# Patient Record
Sex: MALE | Race: WHITE | URBAN - METROPOLITAN AREA
[De-identification: names, ages, dates, MRNs, and addresses within clinical notes are randomized per-mention and may not be internally consistent; named-entity substitution may affect disease eponyms.]

---
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
## 2018-01-10 ENCOUNTER — TELEPHONE (OUTPATIENT)
Dept: FAMILY MEDICINE | Facility: OTHER | Age: 18
End: 2018-01-10

## 2018-01-10 NOTE — TELEPHONE ENCOUNTER
LM for patient to return call.   Need verbal permission to speak with his Mom regarding his health.   Can then call Mom back.   If I do not hear back from patient shortly, I will call Mom to let her know.     Janiya Nino RN, BSN

## 2018-01-10 NOTE — TELEPHONE ENCOUNTER
"Reason for call:  Patient reporting a symptom    Symptom or request:  Ongoing dizziness and trouble concentrating.  At times feels like he \"lost a few minutes\"?  He is in college so mom is calling , patient not present at time of phone call.  Mom is just looking to speak with a nurse regarding low iron and if the symptoms could be related?  Informed her we do not have c2c on file.  She said she should be on his file?  She still would like to just speak with a nurse line then?    Duration (how long have symptoms been present): ongoing    Have you been treated for this before? No    Additional comments: n/a    Phone Number patient can be reached at:  Home number on file [phone] (home)    Best Time:  any    Can we leave a detailed message on this number:  YES    Call taken on 1/10/2018 at 2:32 PM by Caitlyn Montes    "

## 2018-01-10 NOTE — TELEPHONE ENCOUNTER
Called Mom.   Advised that I do not have CTC on file with her.   Advised that I could answer general questions, which I did regarding low iron and ferritin.   Mom gave me a lot of information regarding him and tried to get specific questions answered, but I only gave general information.   She did mention that patient started iron pills.   She asks that I try him again tomorrow to follow up with questions and how he is feeling.     Janiya Nino, RN, BSN

## 2018-01-10 NOTE — TELEPHONE ENCOUNTER
I spoke with patient and received verbal permission to speak with Mom.   His questions were answered and he states that he will get back in touch with his mom.   No need for further follow up.     Janiya Nino, RN, BSN

## 2018-01-22 ENCOUNTER — TELEPHONE (OUTPATIENT)
Dept: FAMILY MEDICINE | Facility: OTHER | Age: 18
End: 2018-01-22

## 2018-01-22 DIAGNOSIS — R79.0 LOW FERRITIN: Primary | ICD-10-CM

## 2018-01-22 NOTE — TELEPHONE ENCOUNTER
Reason for Call: Request for an order or referral:    Order or referral being requested: Iron & Ferratin     Date needed: as soon as possible    Has the patient been seen by the PCP for this problem? YES    Additional comments: Pt's coaches at [employer] are concerned about the low levels at his last OV with how active he is. They suggested he have this done every 2-4 weeks until April. They told him to start iron supplements.     Phone number Patient can be reached at:  [phone]    Best Time:  Any     Can we leave a detailed message on this number?  YES    Call taken on 1/22/2018 at 3:14 PM by Beatrice Li

## 2018-01-22 NOTE — LETTER
[address]  [facility] [address]  [phone]        January 24, 2018    Tony Bergeron  [address] [facility] [address]          Dear Tony,    Orders have been placed for routine labs for your iron. Diet is still best way to get iron but iron supplements are available over the counter. If you take these please do so with vitamin C rich foods or supplement to increase absorption and reduce constipation. Please contact the clinic with any questions or concerns.     Sincerely,        Asia Gambino MD

## 2018-01-22 NOTE — TELEPHONE ENCOUNTER
Diet is still best way to get iron. We can monitor closely for improvement. Orders in for labs.  Iron supplements are available over the counter, and if you take these, please do so with vit C foods or supplement to increase absorption and reduce constipation.  Asia Gambino MD

## 2018-01-24 ENCOUNTER — RESULT FOLLOW UP (OUTPATIENT)
Dept: FAMILY MEDICINE | Facility: OTHER | Age: 18
End: 2018-01-24

## 2018-01-24 ENCOUNTER — TELEPHONE (OUTPATIENT)
Dept: FAMILY MEDICINE | Facility: OTHER | Age: 18
End: 2018-01-24

## 2018-01-24 NOTE — TELEPHONE ENCOUNTER
Pt returned phone call from today - see other encounter. (I am unable to access it to addend it, that is why a new encounter is created here.)  He understands and stated he is unable to return to ER clinic. I let him know order is in for any [place] clinic. No further questions or concerns at this time.    Matilda Serrano, CMA

## 2018-01-29 ENCOUNTER — HEALTH MAINTENANCE LETTER (OUTPATIENT)
Age: 18
End: 2018-01-29

## 2018-02-16 DIAGNOSIS — R79.0 LOW FERRITIN: ICD-10-CM

## 2018-02-16 LAB — FERRITIN SERPL-MCNC: 28 NG/ML (ref 26–388)

## 2018-02-16 PROCEDURE — 82728 ASSAY OF FERRITIN: CPT | Performed by: FAMILY MEDICINE

## 2018-02-16 PROCEDURE — 36415 COLL VENOUS BLD VENIPUNCTURE: CPT | Performed by: FAMILY MEDICINE

## 2018-02-18 NOTE — PROGRESS NOTES
Tony, your results show much improvement. Your coaches wanted to watch this until April, so another order is in. Continue to take in high iron foods.  Please let me know if you have any questions.    Asia Gambino MD

## 2018-03-29 ENCOUNTER — TELEPHONE (OUTPATIENT)
Dept: FAMILY MEDICINE | Facility: OTHER | Age: 18
End: 2018-03-29

## 2018-03-29 NOTE — TELEPHONE ENCOUNTER
Reason for call:  Form  Reason for Call:  Form, our goal is to have forms completed with 72 hours, however, some forms may require a visit or additional information.    Type of letter, form or note:  medical    Who is the form from?: Home care    Where did the form come from: form was faxed in    What clinic location was the form placed at?: [facility] - [phone]    Where the form was placed: [facility]    What number is listed as a contact on the form?: [phone]       Additional comments: please sign,date and return    Call taken on 3/29/2018 at 8:41 AM by Donna Dobson

## 2018-03-30 NOTE — TELEPHONE ENCOUNTER
Unable to locate this form. Encounter creator not in clinic today, so unable to ask her.    Spoke with two people at [facility] - they have no idea who faxed a form or what the form was. She states she will send an email out and if they can figure out what the form was, they will refax it. I will close this encounter.  Ana Laird, CMA

## 2018-04-02 DIAGNOSIS — R79.0 LOW FERRITIN: ICD-10-CM

## 2018-04-02 LAB — FERRITIN SERPL-MCNC: 26 NG/ML (ref 26–388)

## 2018-04-02 PROCEDURE — 36415 COLL VENOUS BLD VENIPUNCTURE: CPT | Performed by: FAMILY MEDICINE

## 2018-04-02 PROCEDURE — 82728 ASSAY OF FERRITIN: CPT | Performed by: FAMILY MEDICINE

## 2018-04-02 NOTE — PROGRESS NOTES
Tony, your results are even lower than last month. 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 Let us know if he needs some ordered.  Please let me know if you have any questions.    Asia Gambino MD

## 2018-04-03 ENCOUNTER — TELEPHONE (OUTPATIENT)
Dept: FAMILY MEDICINE | Facility: OTHER | Age: 18
End: 2018-04-03

## 2018-04-03 NOTE — TELEPHONE ENCOUNTER
Pt states he is taking iron and has changed his diet.  He is wondering why his numbers would fall.  Pt is aware AE is out until Monday and will await her ideas on what could be going on.  Liyah Broderick, CMA

## 2018-04-03 NOTE — TELEPHONE ENCOUNTER
Reason for Call:  Request for results:    Name of test or procedure: labs    Date of test of procedure: yesterday    Location of the test or procedure: [place]    OK to leave the result message on voice mail or with a family member? NO    Phone number Patient can be reached at:  Cell number on file:    Telephone Information:   Mobile [phone]       Additional comments:     Call taken on 4/3/2018 at 11:49 AM by Farhana Hernandez

## 2018-04-03 NOTE — TELEPHONE ENCOUNTER
"Please inform patient of AE note from lab Ferritin completed 04/02/2018.  \"Tony, your results are even lower than last month. 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 Let us know if he needs some ordered.   Please let me know if you have any questions.     Asia Gambino MD\"    Yoli Vargas RN, BSN     "

## 2018-04-05 ENCOUNTER — MYC MEDICAL ADVICE (OUTPATIENT)
Dept: FAMILY MEDICINE | Facility: OTHER | Age: 18
End: 2018-04-05

## 2018-04-06 NOTE — TELEPHONE ENCOUNTER
MyChart sent. There is another encounter with patient's questions in AE's inbasket. DrFirst message copied to that encounter.     Janiya Nino, RN, BSN

## 2018-04-06 NOTE — TELEPHONE ENCOUNTER
Birst message sent today with following message. Covering providers would like AE to address.     Hello -  I was just wondering if you had any thoughts on why my ferritin levels would fluctuate so much?  I have just begun to feel less tired and dizzy in the past 2-3 weeks, and raced last weekend for the first time in months.   I thought I was making progress with my levels going up from 22 to 28. and I'm a bit frustrated to have them drop again.  I have been taking an Iron supplement since early January, and eat a lot of iron rich foods.  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  Is it normal for an athlete's levels to fluctuate so much?  Any thoughts on what else I can do to get the ferritin up?    Thank you for looking into this for me -  Tony Nino, RN, BSN

## 2018-05-17 ENCOUNTER — OFFICE VISIT (OUTPATIENT)
Dept: FAMILY MEDICINE | Facility: OTHER | Age: 18
End: 2018-05-17
Payer: COMMERCIAL

## 2018-05-17 VITALS
BODY MASS INDEX: 20.53 KG/M2 | TEMPERATURE: 98.5 F | HEIGHT: 74 IN | HEART RATE: 56 BPM | SYSTOLIC BLOOD PRESSURE: 110 MMHG | OXYGEN SATURATION: 100 % | DIASTOLIC BLOOD PRESSURE: 60 MMHG | WEIGHT: 160 LBS

## 2018-05-17 DIAGNOSIS — R06.02 SOB (SHORTNESS OF BREATH): ICD-10-CM

## 2018-05-17 DIAGNOSIS — R53.82 CHRONIC FATIGUE: Primary | ICD-10-CM

## 2018-05-17 LAB
ALBUMIN SERPL-MCNC: 4.1 G/DL (ref 3.4–5)
ALP SERPL-CCNC: 65 U/L (ref 40–150)
ALT SERPL W P-5'-P-CCNC: 20 U/L (ref 0–70)
ANION GAP SERPL CALCULATED.3IONS-SCNC: 7 MMOL/L (ref 3–14)
AST SERPL W P-5'-P-CCNC: 14 U/L (ref 0–45)
BILIRUB SERPL-MCNC: 0.6 MG/DL (ref 0.2–1.3)
BUN SERPL-MCNC: 12 MG/DL (ref 7–30)
CALCIUM SERPL-MCNC: 9 MG/DL (ref 8.5–10.1)
CHLORIDE SERPL-SCNC: 106 MMOL/L (ref 94–109)
CO2 SERPL-SCNC: 29 MMOL/L (ref 20–32)
CREAT SERPL-MCNC: 0.92 MG/DL (ref 0.66–1.25)
CRP SERPL-MCNC: <2.9 MG/L (ref 0–8)
ERYTHROCYTE [DISTWIDTH] IN BLOOD BY AUTOMATED COUNT: 11.8 % (ref 10–15)
ERYTHROCYTE [SEDIMENTATION RATE] IN BLOOD BY WESTERGREN METHOD: 3 MM/H (ref 0–15)
FEF 25/75: NORMAL
FEF 25/75: NORMAL
FERRITIN SERPL-MCNC: 30 NG/ML (ref 26–388)
FEV-1: NORMAL
FEV-1: NORMAL
FEV1/FVC: NORMAL
FEV1/FVC: NORMAL
FVC: NORMAL
FVC: NORMAL
GFR SERPL CREATININE-BSD FRML MDRD: >90 ML/MIN/1.7M2
GLUCOSE SERPL-MCNC: 83 MG/DL (ref 70–99)
HCT VFR BLD AUTO: 44.2 % (ref 40–53)
HGB BLD-MCNC: 15 G/DL (ref 13.3–17.7)
IRON SATN MFR SERPL: 52 % (ref 15–46)
IRON SERPL-MCNC: 168 UG/DL (ref 35–180)
MCH RBC QN AUTO: 32.8 PG (ref 26.5–33)
MCHC RBC AUTO-ENTMCNC: 33.9 G/DL (ref 31.5–36.5)
MCV RBC AUTO: 97 FL (ref 78–100)
PLATELET # BLD AUTO: 228 10E9/L (ref 150–450)
POTASSIUM SERPL-SCNC: 4.4 MMOL/L (ref 3.4–5.3)
PROT SERPL-MCNC: 7.7 G/DL (ref 6.8–8.8)
RBC # BLD AUTO: 4.58 10E12/L (ref 4.4–5.9)
SODIUM SERPL-SCNC: 142 MMOL/L (ref 133–144)
TIBC SERPL-MCNC: 325 UG/DL (ref 240–430)
TSH SERPL DL<=0.005 MIU/L-ACNC: 1.24 MU/L (ref 0.4–4)
VIT B12 SERPL-MCNC: 718 PG/ML (ref 193–986)
WBC # BLD AUTO: 5.3 10E9/L (ref 4–11)

## 2018-05-17 PROCEDURE — 82607 VITAMIN B-12: CPT | Performed by: FAMILY MEDICINE

## 2018-05-17 PROCEDURE — 86140 C-REACTIVE PROTEIN: CPT | Performed by: FAMILY MEDICINE

## 2018-05-17 PROCEDURE — 85027 COMPLETE CBC AUTOMATED: CPT | Performed by: FAMILY MEDICINE

## 2018-05-17 PROCEDURE — 94060 EVALUATION OF WHEEZING: CPT | Performed by: FAMILY MEDICINE

## 2018-05-17 PROCEDURE — 80053 COMPREHEN METABOLIC PANEL: CPT | Performed by: FAMILY MEDICINE

## 2018-05-17 PROCEDURE — 36415 COLL VENOUS BLD VENIPUNCTURE: CPT | Performed by: FAMILY MEDICINE

## 2018-05-17 PROCEDURE — 83550 IRON BINDING TEST: CPT | Performed by: FAMILY MEDICINE

## 2018-05-17 PROCEDURE — 83540 ASSAY OF IRON: CPT | Performed by: FAMILY MEDICINE

## 2018-05-17 PROCEDURE — 99214 OFFICE O/P EST MOD 30 MIN: CPT | Mod: 25 | Performed by: FAMILY MEDICINE

## 2018-05-17 PROCEDURE — 85652 RBC SED RATE AUTOMATED: CPT | Performed by: FAMILY MEDICINE

## 2018-05-17 PROCEDURE — 84443 ASSAY THYROID STIM HORMONE: CPT | Performed by: FAMILY MEDICINE

## 2018-05-17 PROCEDURE — 93000 ELECTROCARDIOGRAM COMPLETE: CPT | Performed by: FAMILY MEDICINE

## 2018-05-17 PROCEDURE — 82728 ASSAY OF FERRITIN: CPT | Performed by: FAMILY MEDICINE

## 2018-05-17 RX ORDER — FERROUS SULFATE 325(65) MG
325 TABLET ORAL
COMMUNITY

## 2018-05-17 NOTE — PROGRESS NOTES
"  SUBJECTIVE:   Tony Bergeron is a 18 year old male who presents to clinic today for the following health issues:    Anemia     Patient being seen for ongoing low ferritin for 1 year. Patient is having fatigue, lightheaded, shortness of breath during sports, and dizziness. He is asking for levels to be rechecked. Patient also wanting B12 levels checked as well.     He just finished his running season last week. He reports that he has been trying to eat a lot of different food sources of iron \"even all the gross stuff\", but he has still been feeling excessively tired. When he gets up and starts moving he feels very drained, and his energy doesn't  until the evening. He sleeps well at night, gets about 8 hours year round. Denies allergy type symptoms, but does have some shortness of breath. He hasn't been running this last week and has still been having the fatigue. It used to be that he would get several miles into a run and then get very dizzy and sometimes need to stop.       Component Value Flag Ref Range Units Status Collected Lab   Ferritin 26  26 - 388 ng/mL Final 04/02/2018 10:57 AM [facility]     Component Value Flag Ref Range Units Status Collected Lab   Ferritin 28  26 - 388 ng/mL Final 02/16/2018  2:50 PM 65     Component Value Flag Ref Range Units Status Collected Lab   Ferritin 22 (L) 26 - 388 ng/mL Final 12/28/2017  8:57 AM [facility]         Problem list and histories reviewed & adjusted, as indicated.  Additional history: as documented    There is no problem list on file for this patient.    History reviewed. No pertinent surgical history.    Social History   Substance Use Topics     Smoking status: Never Smoker     Smokeless tobacco: Never Used     Alcohol use No     Family History   Problem Relation Age of Onset     Celiac Disease Sister            ROS:  Constitutional, HEENT, cardiovascular, pulmonary, GI, , musculoskeletal, neuro, skin, endocrine and psych systems are negative, except as in " "HPI or otherwise noted.     This document serves as a record of the services and decisions personally performed and made by Asia Gambino MD. It was created on her behalf by Virginia Francisco, a trained medical scribe. The creation of this document is based the provider's statements to the medical scribe.  Virginia Francisco, May 17, 2018 2:18 PM     OBJECTIVE:                                                    /60  Pulse 56  Temp 98.5  F (36.9  C) (Temporal)  Ht 1.867 m (6' 1.5\")  Wt 72.6 kg (160 lb)  SpO2 100%  BMI 20.82 kg/m2  Body mass index is 20.82 kg/(m^2).   GENERAL: healthy, alert, well nourished, well hydrated, no distress  RESP: lungs clear to auscultation - no rales, no rhonchi, no wheezes  CV: regular rates and rhythm, normal S1 S2, no S3 or S4 and no murmur, no click or rub   ABDOMEN: soft, no tenderness, no  hepatosplenomegaly, no masses, normal bowel sounds  SKIN: no suspicious lesions, no rashes to visible skin  PSYCH: Alert and oriented times 3; speech- coherent , normal rate and volume; able to articulate logical thoughts, able to abstract reason, no tangential thoughts, no hallucinations or delusions, affect- normal    Diagnostic test results:  No results found for this or any previous visit (from the past 24 hour(s)).     ASSESSMENT/PLAN:                                                        ICD-10-CM    1. Chronic fatigue R53.82 TSH with free T4 reflex     CBC with platelets     Vitamin B12     Ferritin     Iron and iron binding capacity     Comprehensive metabolic panel (BMP + Alb, Alk Phos, ALT, AST, Total. Bili, TP)     CRP, inflammation     ESR: Erythrocyte sedimentation rate     Spirometry, Breathing Capacity: Normal Order, Clinic Performed     EKG 12-lead complete w/read - Clinics   2. SOB (shortness of breath) R06.02 Spirometry, Breathing Capacity: Normal Order, Clinic Performed     EKG 12-lead complete w/read - Clinics     Chronic Fatigue: Will get baseline spirometry today and an EKG. " Discussed options for treating his anemia, as he has been on the very low end of normal for a while now, and is reporting that he is getting a good amount of iron containing foods in his diet, but his ferritin is still hovering in the 20s. He can consider an iron infusion to get his levels up, but warned that this would likely be an out of pocket cost. Will also check a full panel of labs for possible causes of fatigue: TSH, CBC, Vitamin B12, Ferritin, Iron and TIBC, CMP, CRP, ESR.   Spirometry results show that he may have some increased resistance in the airways, though he can move a large volume of air. His results were around 85%, which is acceptable, but given his athleticism would expect this to be much higher. Will recheck spirometry after giving an albuterol nebulizer. After nebulizer the results were 89-91%. Not significant enough to think that asthma could be the problem. Will hope for answers to his fatigue with the labs.       Patient Instructions   You will be informed of the results of your labs today when they are available.       The information in this document, created by the medical scribe for me, accurately reflects the services I personally performed and the decisions made by me. I have reviewed and approved this document for accuracy.     Asia Gambino MD, MD  [facility]

## 2018-05-17 NOTE — PROGRESS NOTES
Tony, your lab results so far are all normal.    Please let me know if you have any questions.    Asia Gambino MD

## 2018-05-17 NOTE — MR AVS SNAPSHOT
"              After Visit Summary   5/17/2018    Tony Bergeron    MRN: [id number]           Patient Information     Date Of Birth          2000        Visit Information        Provider Department      5/17/2018 1:45 PM Asia Gambino MD [facility]        Today's Diagnoses     Chronic fatigue    -  1    SOB (shortness of breath)          Care Instructions    You will be informed of the results of your labs today when they are available.           Follow-ups after your visit        Who to contact     If you have questions or need follow up information about today's clinic visit or your schedule please contact [facility] directly at [phone].  Normal or non-critical lab and imaging results will be communicated to you by MyChart, letter or phone within 4 business days after the clinic has received the results. If you do not hear from us within 7 days, please contact the clinic through [employer] or phone. If you have a critical or abnormal lab result, we will notify you by phone as soon as possible.  Submit refill requests through [employer] or call your pharmacy and they will forward the refill request to us. Please allow 3 business days for your refill to be completed.          Additional Information About Your Visit        MyChart Information     [employer] gives you secure access to your electronic health record. If you see a primary care provider, you can also send messages to your care team and make appointments. If you have questions, please call your primary care clinic.  If you do not have a primary care provider, please call [phone] and they will assist you.        Care EveryWhere ID     This is your Care EveryWhere ID. This could be used by other organizations to access your [place] medical records  [id number]        Your Vitals Were     Pulse Temperature Height Pulse Oximetry BMI (Body Mass Index)       56 98.5  F (36.9  C) (Temporal) 6' 1.5\" (1.867 m) 100% 20.82 " kg/m2        Blood Pressure from Last 3 Encounters:   05/17/18 110/60   12/28/17 118/60    Weight from Last 3 Encounters:   05/17/18 160 lb (72.6 kg)   12/28/17 165 lb (74.8 kg) (65 %)*     * Growth percentiles are based on [facility] 2-20 Years data.              We Performed the Following     CBC with platelets     Comprehensive metabolic panel (BMP + Alb, Alk Phos, ALT, AST, Total. Bili, TP)     CRP, inflammation     EKG 12-lead complete w/read - Clinics     ESR: Erythrocyte sedimentation rate     Ferritin     Iron and iron binding capacity     Spirometry, Breathing Capacity: Normal Order, Clinic Performed     Spirometry, Breathing Capacity: Normal Order, Clinic Performed     TSH with free T4 reflex     Vitamin B12        Primary Care Provider Office Phone # Fax #    [facility] [phone]       [address] [place] [id number]        Equal Access to Services     [facility] : Hadii aad ku hadasho Sosea, waaxda luqadaha, qaybta kaalmada adejeffreyyada, jose moreno haymilena kimball . So [facility] [phone].    ATENCIÓN: Si habla español, tiene a mitchell disposición servicios gratuitos de asistencia lingüística. Llame al [phone].    We comply with applicable federal civil rights laws and [place] laws. We do not discriminate on the basis of race, color, national origin, age, disability, sex, sexual orientation, or gender identity.            Thank you!     Thank you for choosing [facility]  for your care. Our goal is always to provide you with excellent care. Hearing back from our patients is one way we can continue to improve our services. Please take a few minutes to complete the written survey that you may receive in the mail after your visit with us. Thank you!             Your Updated Medication List - Protect others around you: Learn how to safely use, store and throw away your medicines at www.disposemymeds.org.          This list is accurate as of  5/17/18  5:48 PM.  Always use your most recent med list.                   Brand Name Dispense Instructions for use Diagnosis    B-12 TR 1000 MCG Tbcr   Generic drug:  cyanocobalamin      Take by mouth daily        ferrous sulfate 325 (65 Fe) MG tablet    IRON     Take 325 mg by mouth daily (with breakfast)

## 2018-05-18 NOTE — PROGRESS NOTES
Tony, your results look good. Ferritin continues to come up. The rest of the labs are essentially normal (the iron saturation being high is likely due to your work to increase the ferritin.  Please let me know if you have any questions.    Asia Gambino MD

## 2018-06-01 DIAGNOSIS — R79.0 LOW FERRITIN: ICD-10-CM

## 2018-06-01 LAB — FERRITIN SERPL-MCNC: 48 NG/ML (ref 26–388)

## 2018-06-01 PROCEDURE — 36415 COLL VENOUS BLD VENIPUNCTURE: CPT | Performed by: FAMILY MEDICINE

## 2018-06-01 PROCEDURE — 82728 ASSAY OF FERRITIN: CPT | Performed by: FAMILY MEDICINE

## 2018-07-02 DIAGNOSIS — R79.0 LOW FERRITIN: ICD-10-CM

## 2018-07-02 LAB — FERRITIN SERPL-MCNC: 29 NG/ML (ref 26–388)

## 2018-07-02 PROCEDURE — 82728 ASSAY OF FERRITIN: CPT | Performed by: FAMILY MEDICINE

## 2018-07-02 PROCEDURE — 36415 COLL VENOUS BLD VENIPUNCTURE: CPT | Performed by: FAMILY MEDICINE

## 2018-08-01 ENCOUNTER — MYC MEDICAL ADVICE (OUTPATIENT)
Dept: FAMILY MEDICINE | Facility: OTHER | Age: 18
End: 2018-08-01

## 2018-08-01 DIAGNOSIS — Z83.79 FH: CELIAC DISEASE: Primary | ICD-10-CM

## 2018-08-01 DIAGNOSIS — R79.0 LOW FERRITIN: ICD-10-CM

## 2018-08-02 DIAGNOSIS — R79.0 LOW FERRITIN: ICD-10-CM

## 2018-08-02 DIAGNOSIS — Z83.79 FH: CELIAC DISEASE: ICD-10-CM

## 2018-08-02 LAB — FERRITIN SERPL-MCNC: 37 NG/ML (ref 26–388)

## 2018-08-02 PROCEDURE — 36415 COLL VENOUS BLD VENIPUNCTURE: CPT | Performed by: FAMILY MEDICINE

## 2018-08-02 PROCEDURE — 82728 ASSAY OF FERRITIN: CPT | Performed by: FAMILY MEDICINE

## 2018-08-02 PROCEDURE — 83516 IMMUNOASSAY NONANTIBODY: CPT | Performed by: FAMILY MEDICINE

## 2018-08-03 LAB — TTG IGA SER-ACNC: 1 U/ML

## 2018-09-06 ENCOUNTER — MYC MEDICAL ADVICE (OUTPATIENT)
Dept: FAMILY MEDICINE | Facility: OTHER | Age: 18
End: 2018-09-06

## 2018-09-06 NOTE — TELEPHONE ENCOUNTER
Responded via [employer]. He should schedule a follow up with Dr. Gambino for ongoing symptoms.     Janiya Nino, RN, BSN

## 2018-09-14 ENCOUNTER — MYC MEDICAL ADVICE (OUTPATIENT)
Dept: FAMILY MEDICINE | Facility: OTHER | Age: 18
End: 2018-09-14

## 2018-09-14 DIAGNOSIS — R42 DIZZINESS: Primary | ICD-10-CM

## 2018-09-14 DIAGNOSIS — E16.2 HYPOGLYCEMIA: ICD-10-CM

## 2018-09-14 NOTE — TELEPHONE ENCOUNTER
AE: would you like to order an a1c or glucose whole blood as lab only then follow up or just an OV with you?  Please advised.    Either way I'm guessing you want to see him.    julee sent to patient to let him know AE is out of clinic until Monday.    Alberto Hennessy, RN, BSN

## 2018-09-14 NOTE — TELEPHONE ENCOUNTER
No available appointments with AE on 09/19 - are you able to work patient in?   Mom/patient aware AE not in clinic today.  If not, will need to schedule with a covering provider if agreeable.  Ana Laird, CMA

## 2018-09-14 NOTE — TELEPHONE ENCOUNTER
Mom calling to schedule appt with dr prather.  Pt would like to be seen 9/19 late morning for a f/u on dizzyness and tingeling.  Please call mom to advise.  She is aware AE not in clinic today.

## 2018-09-14 NOTE — TELEPHONE ENCOUNTER
Will route to covering providers per patient request. Still needs an appointment.  Ana Laird, [facility]

## 2018-10-16 ENCOUNTER — OFFICE VISIT (OUTPATIENT)
Dept: FAMILY MEDICINE | Facility: CLINIC | Age: 18
End: 2018-10-16
Payer: COMMERCIAL

## 2018-10-16 VITALS
BODY MASS INDEX: 21.56 KG/M2 | WEIGHT: 168 LBS | SYSTOLIC BLOOD PRESSURE: 132 MMHG | HEIGHT: 74 IN | HEART RATE: 100 BPM | TEMPERATURE: 98.1 F | RESPIRATION RATE: 14 BRPM | OXYGEN SATURATION: 100 % | DIASTOLIC BLOOD PRESSURE: 66 MMHG

## 2018-10-16 DIAGNOSIS — R06.09 DYSPNEA ON EXERTION: ICD-10-CM

## 2018-10-16 DIAGNOSIS — E16.2 HYPOGLYCEMIA: Primary | ICD-10-CM

## 2018-10-16 LAB
HBA1C MFR BLD: 5 % (ref 0–5.6)
INSULIN SERPL-ACNC: 45.7 MU/L (ref 3–25)

## 2018-10-16 PROCEDURE — 36415 COLL VENOUS BLD VENIPUNCTURE: CPT | Performed by: FAMILY MEDICINE

## 2018-10-16 PROCEDURE — 83525 ASSAY OF INSULIN: CPT | Performed by: FAMILY MEDICINE

## 2018-10-16 PROCEDURE — 84681 ASSAY OF C-PEPTIDE: CPT | Performed by: FAMILY MEDICINE

## 2018-10-16 PROCEDURE — 82947 ASSAY GLUCOSE BLOOD QUANT: CPT | Performed by: FAMILY MEDICINE

## 2018-10-16 PROCEDURE — 83036 HEMOGLOBIN GLYCOSYLATED A1C: CPT | Performed by: FAMILY MEDICINE

## 2018-10-16 PROCEDURE — 99214 OFFICE O/P EST MOD 30 MIN: CPT | Performed by: FAMILY MEDICINE

## 2018-10-16 NOTE — PROGRESS NOTES
SUBJECTIVE:   Tony Bergeron is a 18 year old male who presents to clinic today for the following health issues:      Dizziness      Duration: past few years    Description   Feeling faint:  no   Feeling like the surroundings are moving: no   Loss of consciousness or falls: yes, while running    Intensity:  moderate    Accompanying signs and symptoms:   Nausea/vomitting: no   Palpitations: YES- Yes pressure and pain on left side chest  Weakness in arms or legs: YES  Vision or speech changes: YES- vision sometimes  Ringing in ears (Tinnitus): no   Hearing loss related to dizziness: no   Other (fevers/chills/sweating/dyspnea): no     History (similar episodes/head trauma/previous evaluation/recent bleeding): Yes, ongoing issue    Precipitating or alleviating factors (new meds/chemicals): exercise makes it worst. Relief comes with food ingestion  Worse with activity/head movement: YES- exercise    Therapies tried and outcome: None    * Patient currently monitor blood sugar levels and has a strict nutrition regime oversee by nutritionist    Over the past year or so, Tony has been having symptoms of fatigue, shortness of breath and dizziness associated with running. 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  Work up with his PCP showed a low ferritin (so he was started on an iron supplement), normal EKG, normal CBC, TSH, B12, CMP, and serology for celiac.  He had office spirometry showed that he may have some increased resistance in the airways; his FEV1/FVC of 88% was lower than expected for an athlete (and was not reversible with albuterol).  The patient then started checking his blood sugars and found that he will drop into the 50-60 range when he is running.  He has passed out once while running a 5k, though he notes that it may have also be related to the heat that day.  Otherwise, he develops lightheadedness then vertigo with running and has to lie down and eat something as soon as  "he can.  Eating resolves his sxs.   For breakfast, he has cereal, toast and fruit.  He will check his blood sugar 1-2 hours after the meal and it is 60-80 (without going running).  For lunch, an example meal for him would be fettuccini howard, grilled chicken, fruit, steamed broccoli, peanut butter.  He will eat at least 2000 calories for dinner.  He states he has a hard time gaining weight.  He does not take any \"health store supplements.\"     He does not usually have any symptoms when he is not exerting himself (so at 60-80 after breakfast, he feels ok), but he notes he does have to eat breakfast within 20 minutes of waking up or else he will feel dizzy.  He feels sluggish a lot, and for a while, he was taking naps whenever he could, between classes, despite sleeping well at night.  He denies snoring.  His arms and legs feel heavy when he is working out.  He denies any chest pain.  He will notice occasional palpitations and occasional diaphoresis with dizziness.     He denies any rash, joint pains, blurry vision (will get \"tunnel vision\" when very dizzy), constipation, diarrhea, nail changes, hair loss, or mood disorder.        His sister has celiac disease.  There is no family h/o early CAD or sudden death.  There is no family history of diabetes or other metabolic disorders.  He does not know anyone who takes insulin.           Problem list and histories reviewed & adjusted, as indicated.  Additional history: as documented    Patient Active Problem List   Diagnosis     Chronic fatigue     History reviewed. No pertinent surgical history.    Social History   Substance Use Topics     Smoking status: Never Smoker     Smokeless tobacco: Never Used     Alcohol use No     Family History   Problem Relation Age of Onset     Celiac Disease Sister          Current Outpatient Prescriptions   Medication Sig Dispense Refill     blood glucose (NO BRAND SPECIFIED) lancets standard Use to test blood sugar daily with symptoms. " "100 each 11     blood glucose monitoring (NO BRAND SPECIFIED) meter device kit Use to test blood sugar daily with symptoms 1 kit 0     blood glucose monitoring (NO BRAND SPECIFIED) test strip Use to test blood sugars daily with symptoms 100 strip 1     cyanocobalamin (B-12 TR) 1000 MCG TBCR Take by mouth daily       ferrous sulfate (IRON) 325 (65 Fe) MG tablet Take 325 mg by mouth daily (with breakfast)       No Known Allergies    Reviewed and updated as needed this visit by clinical staff  Tobacco  Allergies  Med Hx  Surg Hx  Fam Hx  Soc Hx      Reviewed and updated as needed this visit by Provider         ROS:  Constitutional, HEENT, cardiovascular, pulmonary, gi and gu systems are negative, except as otherwise noted.    OBJECTIVE:     /66 (BP Location: Left arm, Patient Position: Sitting, Cuff Size: Adult Regular)  Pulse 100  Temp 98.1  F (36.7  C) (Oral)  Resp 14  Ht 6' 1.5\" (1.867 m)  Wt 168 lb (76.2 kg)  SpO2 100%  BMI 21.86 kg/m2  Body mass index is 21.86 kg/(m^2).  GENERAL APPEARANCE: healthy, alert and no distress  EYES: Eyes grossly normal to inspection, PERRL and conjunctivae and sclerae normal  HENT: ear canals and TM's normal and nose and mouth without ulcers or lesions  NECK: no adenopathy, no asymmetry, masses, or scars and thyroid normal to palpation  RESP: lungs clear to auscultation - no rales, rhonchi or wheezes  CV: regular rates and rhythm, normal S1 S2, no S3 or S4 and no murmur, click or rub  ABDOMEN: soft, nontender, without hepatosplenomegaly or masses and bowel sounds normal  MS: extremities normal- no gross deformities noted grossly normal strength  NEURO: Normal strength and tone, mentation intact and speech normal  CN III, IV, VI: EOM intact, pupils equal and reactive  CN V: facial sensation nl  CN VII: face symmetric, no facial droop  CN VIII: hearing normal to conversation  CN IX: palate elevation symmetric, uvula at midline  CN XI SCM normal, shoulder shrug nl  CN " XII: tongue midline  DTRs are symmetric.  Normal gait.   PSYCH: mentation appears normal and affect normal/bright        ASSESSMENT/PLAN:     Tony is an otherwise health 18 year old long-distance runner who is experiencing dizziness in the mornings and with exertion; he has been checking his blood sugars and is hypoglycemic during these episodes.  I recommended an endocrinology referral to evaluate his hypoglycemia . He takes no medications that might cause hypoglycemia.   I would be concerned about a potential insulinoma or other cause of endogenous hyperinsulinism.  No indication of surreptitious hypoglycemia.  We might also consider further work up of his exertional symptoms with echocardiogram, as I would not expect hypoglycemia to cause dyspnea.        1. Hypoglycemia  Glucose tabs ordered.  Will begin work up as below and refer on to endocrinology; appreciate any specialty recommendations.    - Hemoglobin A1c  - Glucose  - Insulin level  - C-peptide  - ENDOCRINOLOGY ADULT REFERRAL    2.  Dyspnea and dizziness on exertion: echo ordered      Elinor Villeda MD  [facility]

## 2018-10-16 NOTE — MR AVS SNAPSHOT
After Visit Summary   10/16/2018    Tony Bergeron    MRN: [id number]           Patient Information     Date Of Birth          2000        Visit Information        Provider Department      10/16/2018 1:40 PM Elinor Villeda MD [facility]        Today's Diagnoses     Hypoglycemia    -  1       Follow-ups after your visit        Additional Services     ENDOCRINOLOGY ADULT REFERRAL       Your provider has referred you to: [facility]: [facility] [phone]   [email or web address]  [facility]: [facility] [phone]   [email or web address]      Please be aware that coverage of these services is subject to the terms and limitations of your health insurance plan.  Call member services at your health plan with any benefit or coverage questions.      Please bring the following to your appointment:    >>   Any x-rays, CTs or MRIs which have been performed.  Contact the facility where they were done to arrange for  prior to your scheduled appointment.    >>   List of current medications   >>   This referral request   >>   Any documents/labs given to you for this referral                  Who to contact     If you have questions or need follow up information about today's clinic visit or your schedule please contact [facility] directly at [phone].  Normal or non-critical lab and imaging results will be communicated to you by MyChart, letter or phone within 4 business days after the clinic has received the results. If you do not hear from us within 7 days, please contact the clinic through MyChart or phone. If you have a critical or abnormal lab result, we will notify you by phone as soon as possible.  Submit refill requests through [facility] [employer] or call your pharmacy and they will forward the refill  "request to us. Please allow 3 business days for your refill to be completed.          Additional Information About Your Visit        [employer] Information     [employer]¢â€žÂ¢ gives you secure access to your electronic health record. If you see a primary care provider, you can also send messages to your care team and make appointments. If you have questions, please call your primary care clinic.  If you do not have a primary care provider, please call [phone] and they will assist you.        Care EveryWhere ID     This is your Care EveryWhere ID. This could be used by other organizations to access your [place] medical records  [id number]        Your Vitals Were     Pulse Temperature Respirations Height Pulse Oximetry BMI (Body Mass Index)    60 98.1  F (36.7  C) (Oral) 14 6' 1.5\" (1.867 m) 96% 21.86 kg/m2       Blood Pressure from Last 3 Encounters:   10/16/18 132/66   05/17/18 110/60   12/28/17 118/60    Weight from Last 3 Encounters:   10/16/18 168 lb (76.2 kg)   05/17/18 160 lb (72.6 kg)   12/28/17 165 lb (74.8 kg) (65 %)*     * Growth percentiles are based on CDC 2-20 Years data.              We Performed the Following     C-peptide     ENDOCRINOLOGY ADULT REFERRAL     Glucose     Hemoglobin A1c     Insulin level        Primary Care Provider Office Phone # Fax #    [facility] [phone]       [address] [place] [address]        Equal Access to Services     JOSE DAVID LEWIS : Hadii katja Loera, waaxda luqadaha, qaybta kaalmada jenyada, jose lemon. So [facility] [phone].    ATENCIÓN: Si habla español, tiene a mitchell disposición servicios gratuitos de asistencia lingüística. Llame al [phone].    We comply with applicable federal civil rights laws and [place] laws. We do not discriminate on the basis of race, color, national origin, age, disability, sex, sexual orientation, or gender identity.            Thank you!     Thank you for " choosing [facility]  for your care. Our goal is always to provide you with excellent care. Hearing back from our patients is one way we can continue to improve our services. Please take a few minutes to complete the written survey that you may receive in the mail after your visit with us. Thank you!             Your Updated Medication List - Protect others around you: Learn how to safely use, store and throw away your medicines at www.disposemymeds.org.          This list is accurate as of 10/16/18  2:20 PM.  Always use your most recent med list.                   Brand Name Dispense Instructions for use Diagnosis    B-12 TR 1000 MCG cr   Generic drug:  cyanocobalamin      Take by mouth daily        blood glucose lancets standard    no brand specified    100 each    Use to test blood sugar daily with symptoms.    Dizziness, Hypoglycemia       blood glucose monitoring meter device kit    no brand specified    1 kit    Use to test blood sugar daily with symptoms    Dizziness, Hypoglycemia       blood glucose monitoring test strip    no brand specified    100 strip    Use to test blood sugars daily with symptoms    Dizziness, Hypoglycemia       ferrous sulfate 325 (65 Fe) MG tablet    IRON     Take 325 mg by mouth daily (with breakfast)

## 2018-10-17 LAB
C PEPTIDE SERPL-MCNC: 6.9 NG/ML (ref 0.9–6.9)
GLUCOSE SERPL-MCNC: 94 MG/DL (ref 70–99)

## 2018-10-24 ENCOUNTER — OFFICE VISIT (OUTPATIENT)
Dept: ENDOCRINOLOGY | Facility: CLINIC | Age: 18
End: 2018-10-24
Attending: FAMILY MEDICINE
Payer: COMMERCIAL

## 2018-10-24 VITALS
SYSTOLIC BLOOD PRESSURE: 133 MMHG | WEIGHT: 165.9 LBS | HEART RATE: 70 BPM | DIASTOLIC BLOOD PRESSURE: 78 MMHG | OXYGEN SATURATION: 99 % | BODY MASS INDEX: 21.99 KG/M2 | HEIGHT: 73 IN

## 2018-10-24 DIAGNOSIS — L74.9 SWEATING ABNORMALITY: ICD-10-CM

## 2018-10-24 DIAGNOSIS — R25.1 TREMOR: ICD-10-CM

## 2018-10-24 DIAGNOSIS — R42 LIGHTHEADEDNESS: Primary | ICD-10-CM

## 2018-10-24 DIAGNOSIS — R53.83 FATIGUE, UNSPECIFIED TYPE: ICD-10-CM

## 2018-10-24 PROCEDURE — 99204 OFFICE O/P NEW MOD 45 MIN: CPT | Performed by: INTERNAL MEDICINE

## 2018-10-24 NOTE — PROGRESS NOTES
"  The patient is seen in consultation at the request of Dr. Elinor Villdea for evaluation of hypoglycemia.     Tony Bergeron is a 18 year old male with a past medical history significant for mild iron deficiency and B12 deficiency.   The patient endorses chronic fatigue and episodes of lightheadedness which have occurred over the last 2 years.  The episodes of lightheadedness are sudden and lead to numbness, tingling sensation and loss of peripheral vision, sometimes \"tunnel vision\".  They tend to occur fasting, in the morning, or following exercise.  He does cross-country running and he experiences them mostly following the exercise.  They last anywhere from a couple of minutes to 30 minutes, and they quickly resolve (within 5 minutes) after having something to eat.  He knows he cannot spend 3-4 hours without eating, as this can trigger the episodes.  However, he denies having them during the night. Over the last 2-year, their frequency and intensity has remained stable.  Intermittently, the episodes of lightheadedness are also associated with palpitations, sweating, tremors.  They never occur following a meal.    In the last year, he reports 3-4 episodes, associated with loss of consciousness.  1 of them occurred after running at a fast pace, for 1 mile.  They can be as frequent as every day or as rare as over 7 days apart.   He feels tired when he wakes up in the morning.  His weight has been stable.  He denies nausea, vomiting, abdominal pain.  He denies experiencing lightheadedness when suddenly standing from a sitting or lying position.  Despite these episodes, he continues to run almost on a daily basis, approximately 70-90 miles a week.  Following trainings which are not so intense, the episodes do not tend to occur.    He recently received a glucometer and he reports checking his blood sugar intermittently, at different times during the day.  The recorded blood sugar numbers are all normal, with the " exception of the blood sugar of 61, at 11 AM, prior to having breakfast. The patient reports being symptomatic at that time.  For all the other numbers, he denies experiencing the above symptoms. On the meter, average blood glucose is 106 with a standard deviation of 22 and a range variable between 61 and 153.       On 10/16/18, blood glucose checked at 2:20 PM was 94, with a C-peptide of 6.9 and an insulin level of 45.7.  The patient reports having something to eat approximately 30 minutes prior to having blood drawn.   Today, he denies having anything to eat for 3 hours.    Past Medical History   Mild iron deficiency   B12 deficiency  Screening for celiac negative 8/2018     Past Surgical History   No past surgical history on file.    Current Medications  Prescription Medications as of 10/24/2018             blood glucose (NO BRAND SPECIFIED) lancets standard Use to test blood sugar daily with symptoms.    blood glucose monitoring (NO BRAND SPECIFIED) meter device kit Use to test blood sugar daily with symptoms    blood glucose monitoring (NO BRAND SPECIFIED) test strip Use to test blood sugars daily with symptoms    cyanocobalamin (B-12 TR) 1000 MCG TBCR Take by mouth daily    ferrous sulfate (IRON) 325 (65 Fe) MG tablet Take 325 mg by mouth daily (with breakfast)    glucose 4 g CHEW chewable tablet Take 1 tablet by mouth every hour as needed for low blood sugar        Family History   Problem Relation Age of Onset     Celiac Disease Sister    Paternal uncle with pancreatic cancer. Maternal grandmother HTN and ? Thyroid issues.     Social History  Single. He denies smoking, drinking alcohol or using illicit drugs. Occupation: student - English.     Review of Systems   Systemic:             Weight stable   Eye:                      No eye symptoms   Jas-Laryngeal:     No jas-laryngeal symptoms, no dysphagia, no hoarseness, no cough     Breast:                  No breast symptoms  Cardiovascular:    No  "cardiovascular symptoms, no CP or palpitations   Pulmonary:           SOB during the episodes, intermittently and mild   Gastrointestinal:   No gastrointestinal symptoms, no diarrhea or constipation   Genitourinary:       No genitourinary symptoms, no increased thirst or urination   Endocrine:            No endocrine symptoms, no cold or heat intolerance   Neurological:        No headaches  Musculoskeletal:  No musculoskeletal symptoms, no muscle or joint pain   Skin:                     No skin symptoms, no dry skin, no hair falling out   Psychological:     No psychological symptoms                Vital Signs     Previous Weights:    Wt Readings from Last 10 Encounters:   10/24/18 75.3 kg (165 lb 14.4 oz)   10/16/18 76.2 kg (168 lb)   05/17/18 72.6 kg (160 lb)   12/28/17 74.8 kg (165 lb) (65 %)*     * Growth percentiles are based on CDC 2-20 Years data.        /78 (BP Location: Left arm, Patient Position: Sitting, Cuff Size: Adult Regular)  Pulse 70  Ht 1.854 m (6' 1\")  Wt 75.3 kg (165 lb 14.4 oz)  SpO2 99%  BMI 21.89 kg/m2  Blood pressure lying down 137/72, pulse 76.  Standing blood pressure 139/92, pulse 75    Physical Exam  General Appearance: he is well developed, well nourished and in no distress    Normal male hair pattern, no gynecomastia  Eyes:  conjutivae and extra-ocular motions are normal.                                    pupils round and reactive to light, no lid lag, no stare    Visual fields normal to confrontation   HEENT:   oropharynx clear and moist, no JVD, no bruits      no thyromegaly, no palpable nodules   Cardiovascular:  regular rhythm, no murmurs, distal pulse palpable, no edema  Respiratory:        chest clear, no rales, no rhonchi   Gastrointestinal:  abdomen soft, non-tender, non-distended, normal bowel sounds,    no organomegaly  Musculoskeletal:  normal tone and strength  Psychological:          affect and judgment normal  Skin:  no lesions, skin diffusely " tan  Neurological:  reflexes normal and symmetric, no resting tremor.     Lab Results  I reviewed prior lab results documented in Epic.  TSH   Date Value Ref Range Status   05/17/2018 1.24 0.40 - 4.00 mU/L Final     Assessment     1.  Episodes of lightheadedness, numbness and tingling sensation, loss of peripheral vision     Tony is a 18 year old male with a past medical history significant for iron and B12 deficiency, presenting with chronic fatigue and episodic lightheadedness with loss of peripheral vision, numbness and tingling sensation and, occasionally, loss of consciousness.  The episodes tend to occur mainly following intense exercise, sometimes fasting, and are quickly relieved by food intake.  Although his symptoms are suggestive of a Whipple triad, the hypoglycemia has not been documented at the time he is symptomatic, although he did have one lower blood glucose on the glucometer.  It is interesting that symptoms which are more characteristic of hypoglycemia (like hunger, sweating, palpitations, tremor) are inconsistent and not always present.  This might suggest that the patient has some degree of hypoglycemia unawareness which can be a consequence frequent hypoglycemic episodes.  Again, their existence has to be confirmed by a low blood sugar at the time the patient is symptomatic.     He denies taking any over-the-counter supplements or medications which can cause hypoglycemia.  He also denies the use of alcohol.  There is no family history of diabetes and nobody in the family is using antidiabetic medications.  There is no evidence of orthostatic hypotension.    Differential diagnosis: Adrenal insufficiency (questionable diagnosis of celiac disease/pernicious anemia might support this diagnosis, in the context of B12 and mild iron deficiency, electrolytes have been normal - making primary adrenal insufficiency less likely), insulinoma, nesidioblastosis (less likely, since the episodes do not occur  postprandially), insulin autoimmune hypoglycemia, IGF-2 oversecretion.    Plan:   Check morning ACTH and cortisol level, to evaluate for adrenal insufficiency  Check IGF-I, to confirm intact pituitary function  Check fasting glucose and C-peptide levels.  The patient was instructed to come to the lab fasting, in the morning, and postpone having the labs drawn until he starts developing mild symptoms.  If he desires to check the BG with the meter, he should do this only when symptomatic.       2.  Chronic fatigue  TSH has been normal.  Check free T4, to confirm the euthyroid status.    Orders Placed This Encounter   Procedures     T4 free     Insulin Growth Factor 1 by Immunoassay     Cortisol     Glucose     C-peptide     Adrenal corticotropin

## 2018-10-24 NOTE — MR AVS SNAPSHOT
After Visit Summary   10/24/2018    Tony Bergeron    MRN: [id number]           Patient Information     Date Of Birth          2000        Visit Information        Provider Department      10/24/2018 2:00 PM Elinor Ibarra MD [facility]        Today's Diagnoses     Type 1 diabetes mellitus affecting pregnancy in third trimester, antepartum    -  1    Lightheadedness        Tremor        Sweating abnormality        Fatigue, unspecified type           Follow-ups after your visit        Follow-up notes from your care team     Return for fasting labs.      Your next 10 appointments already scheduled     Oct 30, 2018  9:30 AM CDT   LAB with [facility]   [facility]    [address] [place] [id number] [phone]           Please do not eat 10-12 hours before your appointment if you are coming in fasting for labs on lipids, cholesterol, or glucose (sugar). This does not apply to pregnant women. Water, hot tea and black coffee (with nothing added) are okay. Do not drink other fluids, diet soda or chew gum.              Future tests that were ordered for you today     Open Future Orders        Priority Expected Expires Ordered    T4 free Routine 10/31/2018 10/24/2019 10/24/2018    Insulin Growth Factor 1 by Immunoassay Routine 10/31/2018 10/24/2019 10/24/2018    Cortisol Routine 10/31/2018 10/24/2019 10/24/2018    Glucose Routine 10/31/2018 10/24/2019 10/24/2018    C-peptide Routine 10/31/2018 10/24/2019 10/24/2018            Who to contact     If you have questions or need follow up information about today's clinic visit or your schedule please contact [facility] directly at [phone].  Normal or non-critical lab and imaging results will be communicated to you by MyChart, letter or phone within 4 business days after the clinic has received the results. If you do not hear from us within 7 days,  "please contact the clinic through [employer] or phone. If you have a critical or abnormal lab result, we will notify you by phone as soon as possible.  Submit refill requests through [employer] or call your pharmacy and they will forward the refill request to us. Please allow 3 business days for your refill to be completed.          Additional Information About Your Visit        [facility] Information     [employer] gives you secure access to your electronic health record. If you see a primary care provider, you can also send messages to your care team and make appointments. If you have questions, please call your primary care clinic.  If you do not have a primary care provider, please call [phone] and they will assist you.      [employer] is an electronic gateway that provides easy, online access to your medical records. With [employer], you can request a clinic appointment, read your test results, renew a prescription or communicate with your care team.     To access your existing account, please contact your [facility] or call [phone] for assistance.        Care EveryWhere ID     This is your Care EveryWhere ID. This could be used by other organizations to access your [place] medical records  [id number]        Your Vitals Were     Pulse Height Pulse Oximetry BMI (Body Mass Index)          70 1.854 m (6' 1\") 99% 21.89 kg/m2         Blood Pressure from Last 3 Encounters:   10/24/18 133/78   10/16/18 132/66   05/17/18 110/60    Weight from Last 3 Encounters:   10/24/18 75.3 kg (165 lb 14.4 oz)   10/16/18 76.2 kg (168 lb)   05/17/18 72.6 kg (160 lb)               Primary Care Provider Office Phone # Fax #    [facility] [phone]       [address] [place] [address]        Equal Access to Services     JOSE DAVID LEWIS : Oneida Loera, brina lindo, leo kaaljose pope. So wa " [phone].    ATENCIÓN: Si john martinez, tiene a mitchell disposición servicios gratuitos de asistencia lingüística. Nnamdi patrick [phone].    We comply with applicable federal civil rights laws and [place] laws. We do not discriminate on the basis of race, color, national origin, age, disability, sex, sexual orientation, or gender identity.            Thank you!     Thank you for choosing [facility]  for your care. Our goal is always to provide you with excellent care. Hearing back from our patients is one way we can continue to improve our services. Please take a few minutes to complete the written survey that you may receive in the mail after your visit with us. Thank you!             Your Updated Medication List - Protect others around you: Learn how to safely use, store and throw away your medicines at www.disposemymeds.org.          This list is accurate as of 10/24/18  3:25 PM.  Always use your most recent med list.                   Brand Name Dispense Instructions for use Diagnosis    B-12 TR 1000 MCG Tbcr   Generic drug:  cyanocobalamin      Take by mouth daily        blood glucose lancets standard    no brand specified    100 each    Use to test blood sugar daily with symptoms.    Dizziness, Hypoglycemia       blood glucose monitoring meter device kit    no brand specified    1 kit    Use to test blood sugar daily with symptoms    Dizziness, Hypoglycemia       blood glucose monitoring test strip    no brand specified    100 strip    Use to test blood sugars daily with symptoms    Dizziness, Hypoglycemia       ferrous sulfate 325 (65 Fe) MG tablet    IRON     Take 325 mg by mouth daily (with breakfast)        glucose 4 g Chew chewable tablet     100 tablet    Take 1 tablet by mouth every hour as needed for low blood sugar    Hypoglycemia

## 2018-10-24 NOTE — NURSING NOTE
"Tony Bergeron's goals for this visit include: consult - hypoglycemia  He requests these members of his care team be copied on today's visit information: Yes    PCP: [facility]    Referring Provider:  Elinor Villeda MD  [address] [place] [address]    /78 (BP Location: Left arm, Patient Position: Sitting, Cuff Size: Adult Regular)  Pulse 70  Ht 1.854 m (6' 1\")  Wt 75.3 kg (165 lb 14.4 oz)  SpO2 99%  BMI 21.89 kg/m2    Do you need any medication refills at today's visit? No    "

## 2018-12-31 ENCOUNTER — ANCILLARY PROCEDURE (OUTPATIENT)
Dept: CARDIOLOGY | Facility: CLINIC | Age: 18
End: 2018-12-31
Attending: FAMILY MEDICINE
Payer: COMMERCIAL

## 2018-12-31 DIAGNOSIS — R06.09 DYSPNEA ON EXERTION: ICD-10-CM

## 2018-12-31 PROCEDURE — 93306 TTE W/DOPPLER COMPLETE: CPT | Performed by: INTERNAL MEDICINE

## 2020-03-11 ENCOUNTER — HEALTH MAINTENANCE LETTER (OUTPATIENT)
Age: 20
End: 2020-03-11

## 2021-08-30 NOTE — LETTER
"    10/24/2018         RE: Tony Bergeron  [address] [facility] [address]        Dear Colleague,    Thank you for referring your patient, Tony Bergeron, to the [facility]. Please see a copy of my visit note below.      The patient is seen in consultation at the request of Dr. Elinor Villeda for evaluation of hypoglycemia.     Tony Bergeron is a 18 year old male with a past medical history significant for mild iron deficiency and B12 deficiency.   The patient endorses chronic fatigue and episodes of lightheadedness which have occurred over the last 2 years.  The episodes of lightheadedness are sudden and lead to numbness, tingling sensation and loss of peripheral vision, sometimes \"tunnel vision\".  They tend to occur fasting, in the morning, or following exercise.  He does cross-country running and he experiences them mostly following the exercise.  They last anywhere from a couple of minutes to 30 minutes, and they quickly resolve (within 5 minutes) after having something to eat.  He knows he cannot spend 3-4 hours without eating, as this can trigger the episodes.  However, he denies having them during the night. Over the last 2-year, their frequency and intensity has remained stable.  Intermittently, the episodes of lightheadedness are also associated with palpitations, sweating, tremors.  They never occur following a meal.    In the last year, he reports 3-4 episodes, associated with loss of consciousness.  1 of them occurred after running at a fast pace, for 1 mile.  They can be as frequent as every day or as rare as over 7 days apart.   He feels tired when he wakes up in the morning.  His weight has been stable.  He denies nausea, vomiting, abdominal pain.  He denies experiencing lightheadedness when suddenly standing from a sitting or lying position.  Despite these episodes, he continues to run almost on a daily basis, approximately 70-90 miles a week.  Following trainings which " are not so intense, the episodes do not tend to occur.    He recently received a glucometer and he reports checking his blood sugar intermittently, at different times during the day.  The recorded blood sugar numbers are all normal, with the exception of the blood sugar of 61, at 11 AM, prior to having breakfast. The patient reports being symptomatic at that time.  For all the other numbers, he denies experiencing the above symptoms. On the meter, average blood glucose is 106 with a standard deviation of 22 and a range variable between 61 and 153.       On 10/16/18, blood glucose checked at 2:20 PM was 94, with a C-peptide of 6.9 and an insulin level of 45.7.  The patient reports having something to eat approximately 30 minutes prior to having blood drawn.   Today, he denies having anything to eat for 3 hours.    Past Medical History   Mild iron deficiency   B12 deficiency  Screening for celiac negative 8/2018     Past Surgical History   No past surgical history on file.    Current Medications  Prescription Medications as of 10/24/2018             blood glucose (NO BRAND SPECIFIED) lancets standard Use to test blood sugar daily with symptoms.    blood glucose monitoring (NO BRAND SPECIFIED) meter device kit Use to test blood sugar daily with symptoms    blood glucose monitoring (NO BRAND SPECIFIED) test strip Use to test blood sugars daily with symptoms    cyanocobalamin (B-12 TR) 1000 MCG TBCR Take by mouth daily    ferrous sulfate (IRON) 325 (65 Fe) MG tablet Take 325 mg by mouth daily (with breakfast)    glucose 4 g CHEW chewable tablet Take 1 tablet by mouth every hour as needed for low blood sugar        Family History   Problem Relation Age of Onset     Celiac Disease Sister    Paternal uncle with pancreatic cancer. Maternal grandmother HTN and ? Thyroid issues.     Social History  Single. He denies smoking, drinking alcohol or using illicit drugs. Occupation: student - English.     Review of Systems  "  Systemic:             Weight stable   Eye:                      No eye symptoms   Jas-Laryngeal:     No jas-laryngeal symptoms, no dysphagia, no hoarseness, no cough     Breast:                  No breast symptoms  Cardiovascular:    No cardiovascular symptoms, no CP or palpitations   Pulmonary:           SOB during the episodes, intermittently and mild   Gastrointestinal:   No gastrointestinal symptoms, no diarrhea or constipation   Genitourinary:       No genitourinary symptoms, no increased thirst or urination   Endocrine:            No endocrine symptoms, no cold or heat intolerance   Neurological:        No headaches  Musculoskeletal:  No musculoskeletal symptoms, no muscle or joint pain   Skin:                     No skin symptoms, no dry skin, no hair falling out   Psychological:     No psychological symptoms                Vital Signs     Previous Weights:    Wt Readings from Last 10 Encounters:   10/24/18 75.3 kg (165 lb 14.4 oz)   10/16/18 76.2 kg (168 lb)   05/17/18 72.6 kg (160 lb)   12/28/17 74.8 kg (165 lb) (65 %)*     * Growth percentiles are based on [facility] 2-20 Years data.        /78 (BP Location: Left arm, Patient Position: Sitting, Cuff Size: Adult Regular)  Pulse 70  Ht 1.854 m (6' 1\")  Wt 75.3 kg (165 lb 14.4 oz)  SpO2 99%  BMI 21.89 kg/m2  Blood pressure lying down 137/72, pulse 76.  Standing blood pressure 139/92, pulse 75    Physical Exam  General Appearance: he is well developed, well nourished and in no distress    Normal male hair pattern, no gynecomastia  Eyes:  conjutivae and extra-ocular motions are normal.                                    pupils round and reactive to light, no lid lag, no stare    Visual fields normal to confrontation   HEENT:   oropharynx clear and moist, no JVD, no bruits      no thyromegaly, no palpable nodules   Cardiovascular:  regular rhythm, no murmurs, distal pulse palpable, no edema  Respiratory:        chest clear, no rales, no rhonchi "   Gastrointestinal:  abdomen soft, non-tender, non-distended, normal bowel sounds,    no organomegaly  Musculoskeletal:  normal tone and strength  Psychological:          affect and judgment normal  Skin:  no lesions, skin diffusely tan  Neurological:  reflexes normal and symmetric, no resting tremor.     Lab Results  I reviewed prior lab results documented in Epic.  TSH   Date Value Ref Range Status   05/17/2018 1.24 0.40 - 4.00 mU/L Final     Assessment     1.  Episodes of lightheadedness, numbness and tingling sensation, loss of peripheral vision     Tony is a 18 year old male with a past medical history significant for iron and B12 deficiency, presenting with chronic fatigue and episodic lightheadedness with loss of peripheral vision, numbness and tingling sensation and, occasionally, loss of consciousness.  The episodes tend to occur mainly following intense exercise, sometimes fasting, and are quickly relieved by food intake.  Although his symptoms are suggestive of a Whipple triad, the hypoglycemia has not been documented at the time he is symptomatic, although he did have one lower blood glucose on the glucometer.  It is interesting that symptoms which are more characteristic of hypoglycemia (like hunger, sweating, palpitations, tremor) are inconsistent and not always present.  This might suggest that the patient has some degree of hypoglycemia unawareness which can be a consequence frequent hypoglycemic episodes.  Again, their existence has to be confirmed by a low blood sugar at the time the patient is symptomatic.     He denies taking any over-the-counter supplements or medications which can cause hypoglycemia.  He also denies the use of alcohol.  There is no family history of diabetes and nobody in the family is using antidiabetic medications.  There is no evidence of orthostatic hypotension.    Differential diagnosis: Adrenal insufficiency (questionable diagnosis of celiac disease/pernicious anemia  might support this diagnosis, in the context of B12 and mild iron deficiency, electrolytes have been normal - making primary adrenal insufficiency less likely), insulinoma, nesidioblastosis (less likely, since the episodes do not occur postprandially), insulin autoimmune hypoglycemia, IGF-2 oversecretion.    Plan:   Check morning ACTH and cortisol level, to evaluate for adrenal insufficiency  Check IGF-I, to confirm intact pituitary function  Check fasting glucose and C-peptide levels.  The patient was instructed to come to the lab fasting, in the morning, and postpone having the labs drawn until he starts developing mild symptoms.  If he desires to check the BG with the meter, he should do this only when symptomatic.       2.  Chronic fatigue  TSH has been normal.  Check free T4, to confirm the euthyroid status.    Orders Placed This Encounter   Procedures     T4 free     Insulin Growth Factor 1 by Immunoassay     Cortisol     Glucose     C-peptide     Adrenal corticotropin                             Again, thank you for allowing me to participate in the care of your patient.        Sincerely,        Elinor Ibarra MD     Bactrim Pregnancy And Lactation Text: This medication is Pregnancy Category D and is known to cause fetal risk.  It is also excreted in breast milk.